# Patient Record
Sex: FEMALE | Race: AMERICAN INDIAN OR ALASKA NATIVE
[De-identification: names, ages, dates, MRNs, and addresses within clinical notes are randomized per-mention and may not be internally consistent; named-entity substitution may affect disease eponyms.]

---

## 2018-06-16 ENCOUNTER — HOSPITAL ENCOUNTER (EMERGENCY)
Dept: HOSPITAL 5 - ED | Age: 63
Discharge: HOME | End: 2018-06-16
Payer: COMMERCIAL

## 2018-06-16 VITALS — DIASTOLIC BLOOD PRESSURE: 86 MMHG | SYSTOLIC BLOOD PRESSURE: 129 MMHG

## 2018-06-16 DIAGNOSIS — M50.30: Primary | ICD-10-CM

## 2018-06-16 DIAGNOSIS — F17.200: ICD-10-CM

## 2018-06-16 DIAGNOSIS — M19.90: ICD-10-CM

## 2018-06-16 PROCEDURE — 99283 EMERGENCY DEPT VISIT LOW MDM: CPT

## 2018-06-16 PROCEDURE — 72040 X-RAY EXAM NECK SPINE 2-3 VW: CPT

## 2018-06-16 NOTE — XRAY REPORT
FINAL REPORT



EXAM:  XR SHOULDER 2+V LT



HISTORY:  left shoulder pain 



TECHNIQUE:  3 views of the left shoulder



PRIORS:  None.



FINDINGS:  

Degenerative arthrosis is present at the AC joint with slight

articular surface irregularity and marginal hypertrophy.  



Degenerative change at the glenohumeral joint is associated with

minimal articular surface irregularity.



There is slight degenerative cortical remodeling at undersurface

of the acromion and the superior margin of the greater

tuberosity.



No acute fracture or dislocation is evident.



IMPRESSION:  

No acute skeletal pathology



Slight degenerative change

## 2018-06-16 NOTE — EMERGENCY DEPARTMENT REPORT
Upper Extremity





- HPI


Chief Complaint: Pain General


Stated Complaint: LEFT SHOULDER PAIN


Time Seen by Provider: 06/16/18 13:03


Upper Extremity: Left Shoulder


Occurred When: 2 Days


Mechanism: Unsure


Severity: moderate


Symptoms: Yes Pain with Movement, No Deformity, No Limited Range of Movement, 

No Numbness, No Weakness, No Swelling, No Bruising/Ecchymosis, No Laceration or 

Abrasion


Other History: This is a 63-year-old -American female who presents with 

left trapezius pain radiating to left shoulder.  Patient reports using cold and 

hot compresses with no improvement of symptoms.  She went and saw her primary 

care doctor Juan Carlos on last Thursday and was given Flexeril and meloxicam.  

Pain is intermittent and achy.  It is 10/10 on pain scale and aggravated by 

movement.  Patient reports symptoms are improved with immobility and range of 

motion exercises.  Admits to full range of motion.  Denies numbness or tingling

, swelling, erythema, chest pain, shortness of breath, and snap or pop 

sensation was found.





ED Review of Systems


ROS: 


Stated complaint: LEFT SHOULDER PAIN


Other details as noted in HPI





Constitutional: denies: chills, fever


Respiratory: denies: cough, shortness of breath, wheezing


Cardiovascular: denies: chest pain, palpitations


Gastrointestinal: denies: abdominal pain, nausea, diarrhea


Musculoskeletal: arthralgia (left shoulder pain).  denies: back pain, joint 

swelling


Neurological: denies: headache, weakness, numbness, paresthesias


Psychiatric: denies: anxiety, depression





ED Past Medical Hx





- Past Medical History


Previous Medical History?: No





- Surgical History


Past Surgical History?: No





- Social History


Smoking Status: Current Every Day Smoker


Substance Use Type: Alcohol





Upper Extremity Exam





- Exam


General: 


Vital signs noted. No distress. Alert and acting appropriately.





Head and Torso: Yes Neck Tenderness (left trapezius), No HEENT Abnormality, No 

Chest/Lungs Abnormality, No Abdominal Tenderness, No Back Tenderness


Shoulder Exam: Yes Normal Range of Motion in Shoulder, Yes AC Joint Tenderness, 

No Shoulder Tenderness, No Clavicle Tenderness, No Shoulder Deformity


Arm Exam: No Arm/Humerus Tenderness, No Arm Deformity


Elbow: No Elbow Tenderness, No Normal Range of Motion in Elbow, No Elbow 

Deformity


Forearm: No Forearm Tenderness, No Forearm Deformity, No Pain with Pronation, 

No Pain with Supination


Wrist: Yes Normal ROM in Wrist, No Wrist Tenderness, No Wrist Deformity, No 

Snuffbox Tenderness, No Pain with Axial Thumb Compression


Hand: Yes Normal ROM in Digit(s), No Hand Tenderness, No Hand Deformity, No 

Digit Tenderness, No Digit(s) Deformity, No Tendon Dysfunction


CMS Exam: No Broken Skin, No Normal Distal Pulses, No Normal Capillary Refill, 

No Normal Distal Sensation





ED Course


 Vital Signs











  06/16/18





  12:16


 


Temperature 98.9 F


 


Pulse Rate 92 H


 


Blood Pressure 129/86














ED Medical Decision Making





- Radiology Data


Radiology results: report reviewed





EXAM: XR SHOULDER 2+V LT 





HISTORY: left shoulder pain 





TECHNIQUE: 3 views of the left shoulder 





PRIORS: None. 





FINDINGS: 


Degenerative arthrosis is present at the AC joint with slight 


articular surface irregularity and marginal hypertrophy. 





Degenerative change at the glenohumeral joint is associated with 


minimal articular surface irregularity. 





There is slight degenerative cortical remodeling at undersurface 


of the acromion and the superior margin of the greater 


tuberosity. 





No acute fracture or dislocation is evident. 





IMPRESSION: 


No acute skeletal pathology 





Slight degenerative change 











XR spine cervical Impression:  No acute skeletal pathology.  Multilevel 

degenerative disc and joint disease with C5 to C6 moderate disc narrowing.





- Medical Decision Making





This is a 63 y.o. female presents with neck and left shoulder pain for 2 days. 

Patient was examined by me.  Vitals normal.  No acute distress noted.  Xray of C

-spine and left shoulder obtained and read by radiologist.  No acute skeletal 

pathology Slight degenerative change.  XR spine, no acute skeletal pathology.  

Multilevel degenerative disc and joint disease with C5 to C6 moderate disc 

narrowing.  Patient informed of results.  Instructed to continue taken 

meloxicam and cyclobenzaprine prescribed from Dr. Mariee, patient's PCP.  Plan 

discussed with patient to discharge home and treat outpatient.  She agrees with 

ER plan. Patient discharged home in stable condition. Follow up with PCP in 2-3 

days.








Critical care attestation.: 


If time is entered above; I have spent that time in minutes in the direct care 

of this critically ill patient, excluding procedure time.








ED Disposition


Clinical Impression: 


 Degenerative disc disease, cervical





Osteoarthritis


Qualifiers:


 Osteoarthritis location: shoulder Osteoarthritis type: primary Laterality: 

left Qualified Code(s): M19.012 - Primary osteoarthritis, left shoulder





Disposition: DC-01 TO HOME OR SELFCARE


Is pt being admited?: No


Does the pt Need Aspirin: No


Condition: Stable


Instructions:  Degenerative Disc Disease (ED), Osteoarthritis (ED), Neck 

Exercises (GEN)


Additional Instructions: 


Rest


Use ice or heat on affected area for 20 minutes and off for 2 hours.


Take pain medication as needed for pain.


Don't drive or operate heavy machinery while taking muscle relaxers because 

they may cause drowsiness.


Follow up with Primary Care Provider in 2-3 days.


Referrals: 


ADRIÁN MARIEE MD [Referring] - 3-5 Days


Time of Disposition: 14:52


Print Language: ENGLISH

## 2018-06-16 NOTE — XRAY REPORT
FINAL REPORT



EXAM:  XR SPINE CERVICAL 2-3V



HISTORY:  left trapezium tenderness 



TECHNIQUE:  3 views of the cervical spine



PRIORS:  None.



FINDINGS:  

Prevertebral soft tissues are without swelling.  No evidence of

cervical fracture or vertebral compression. Multilevel

degenerative changes are present at the vertebral endplates,

facet joints, and uncinate joints.  Spondylolisthesis is not

visualized. C5-6 moderate disc narrowing.



IMPRESSION:  

No acute skeletal pathology



Multilevel degenerative disc and joint disease with C5-6 moderate

disc narrowing